# Patient Record
Sex: FEMALE | Race: WHITE | NOT HISPANIC OR LATINO | URBAN - METROPOLITAN AREA
[De-identification: names, ages, dates, MRNs, and addresses within clinical notes are randomized per-mention and may not be internally consistent; named-entity substitution may affect disease eponyms.]

---

## 2022-07-05 NOTE — PATIENT DISCUSSION
Pt was given -.48 SPH -.75 CYL and Axis 030 OS in Acuvue Oasys for Astigmatism today for trials due to us not having them in stock for Acuvue Vitas for astigmatism. If pt likes her new RX and we cannot order her Prescription in vitas we can switch the OS to the one that we gave at today's visit.

## 2022-07-05 NOTE — PATIENT DISCUSSION
Pt was having trouble seeing up close in OS trials so after speaking to Mrs. Luci Butler and then Dr. Josefina Lazo we decided to keep the new rx CL for OD but change her OS to her old CL rx for that OS.

## 2023-01-25 ENCOUNTER — NEW PATIENT (OUTPATIENT)
Dept: URBAN - METROPOLITAN AREA CLINIC 53 | Facility: CLINIC | Age: 33
End: 2023-01-25

## 2023-01-25 DIAGNOSIS — H53.8: ICD-10-CM

## 2023-01-25 DIAGNOSIS — H11.151: ICD-10-CM

## 2023-01-25 DIAGNOSIS — Z01.01: ICD-10-CM

## 2023-01-25 PROCEDURE — 92015 DETERMINE REFRACTIVE STATE: CPT

## 2023-01-25 PROCEDURE — 92004 COMPRE OPH EXAM NEW PT 1/>: CPT

## 2023-01-25 ASSESSMENT — VISUAL ACUITY
OD_CC: 20/20
OS_SC: 20/25
OD_SC: 20/30
OS_CC: 20/20
OU_CC: J1+
OU_CC: 20/20

## 2025-01-07 ENCOUNTER — COMPREHENSIVE EXAM (OUTPATIENT)
Age: 35
End: 2025-01-07

## 2025-01-07 DIAGNOSIS — H53.8: ICD-10-CM

## 2025-01-07 DIAGNOSIS — H52.13: ICD-10-CM

## 2025-01-07 DIAGNOSIS — Z01.01: ICD-10-CM

## 2025-01-07 PROCEDURE — 92014 COMPRE OPH EXAM EST PT 1/>: CPT

## 2025-01-07 PROCEDURE — 92015 DETERMINE REFRACTIVE STATE: CPT
